# Patient Record
Sex: FEMALE | Race: WHITE | NOT HISPANIC OR LATINO | Employment: PART TIME | URBAN - METROPOLITAN AREA
[De-identification: names, ages, dates, MRNs, and addresses within clinical notes are randomized per-mention and may not be internally consistent; named-entity substitution may affect disease eponyms.]

---

## 2024-08-05 ENCOUNTER — TRANSFERRED RECORDS (OUTPATIENT)
Dept: HEALTH INFORMATION MANAGEMENT | Facility: CLINIC | Age: 19
End: 2024-08-05

## 2024-08-05 LAB
C TRACH DNA SPEC QL PROBE+SIG AMP: NEGATIVE
N GONORRHOEA DNA SPEC QL PROBE+SIG AMP: NEGATIVE
PAP-ABSTRACT: NORMAL

## 2024-08-16 ENCOUNTER — TELEPHONE (OUTPATIENT)
Dept: FAMILY MEDICINE | Facility: CLINIC | Age: 19
End: 2024-08-16
Payer: COMMERCIAL

## 2024-08-16 NOTE — TELEPHONE ENCOUNTER
Health Call Center    Phone Message    May a detailed message be left on voicemail: no     Reason for Call: Appointment Intake    Referring Provider Name: Dr. Herber Bates     Pt's provider, Sharon Bermeo, from [facility] sent a referral for pt to establish care with Dr. Bates for genital arousal disorder    Action Taken: Message routed to: Alba MARK paperwork, RAN, and general consent was sent to pt via email on 8/16    Date of Service: 8/16/2024 Marya Padilla

## 2024-09-05 ENCOUNTER — TELEPHONE (OUTPATIENT)
Dept: FAMILY MEDICINE | Facility: CLINIC | Age: 19
End: 2024-09-05
Payer: COMMERCIAL

## 2024-09-05 NOTE — CONFIDENTIAL NOTE
Telephone Intake--REST  Date: 2024  Client Name:  Yajaira Galo   MRN: [id number]  : 2005       Age:  19 year old      Presenting Problem / Reason for Assessment   (Clinical History &Symptoms):     Genital Arousal Disorder    Length of time experiencing symptoms:  Parents noticed ursula behavior around age 5    What is the Main Goal for the visit? Medical options and treatment    Have you seen another medical provider for this or a related issue? No  -- If yes, are you looking for a 2nd opinion or something else?       Have you seen a Mental Health Provider for this issue? (if so, where):    Therapist:  NO  --if yes, request a referral or summary letter from the therapist at the 1st session..    Psychiatrist:  NO  --if yes,, request records from the provider at the 1st session..      Other Medical/Mental Health Diagnoses:  ADHD, Anxiety        If needed, clarify if patient calling from group home or other supervised living arrangement  Note if patient has no mental health or cognitive diagnosis, but phone behavior suggests impairment      Medications:  Gabapentin, Propranolol (PRN)         Primary Care Provider: Sharon Bermeo MD  Provider and Clinic Name:  [facility]  --If multiple medical conditions, request recent records from primary doctor at the 1st session..      Referral Source:        Follow Up:        Please Verify Registration    If patient has [employer] or [email or web address], send to .     Prep Welcome Packet and have patient come half hour beforehand to fill out forms in packet (health history form, Safety Screening, PHQ9, and [id number].

## 2024-12-16 ENCOUNTER — OFFICE VISIT (OUTPATIENT)
Dept: FAMILY MEDICINE | Facility: CLINIC | Age: 19
End: 2024-12-16
Payer: COMMERCIAL

## 2024-12-16 DIAGNOSIS — F90.9 ATTENTION DEFICIT HYPERACTIVITY DISORDER (ADHD), UNSPECIFIED ADHD TYPE: ICD-10-CM

## 2024-12-16 DIAGNOSIS — F40.10 SOCIAL ANXIETY DISORDER: ICD-10-CM

## 2024-12-16 DIAGNOSIS — F52.6 SEXUAL PAIN DISORDER: Primary | ICD-10-CM

## 2024-12-16 PROCEDURE — 99205 OFFICE O/P NEW HI 60 MIN: CPT | Performed by: FAMILY MEDICINE

## 2024-12-16 RX ORDER — GABAPENTIN 300 MG/1
300 CAPSULE ORAL AT BEDTIME
Qty: 90 CAPSULE | Refills: 0 | Status: SHIPPED | OUTPATIENT
Start: 2024-12-16

## 2024-12-16 NOTE — PROGRESS NOTES
Initial evaluation type: Sexual Medicine Evaluation         HPI   ID: 19 year old cisgender woman         CC: Here for Initial evaluation type: Sexual Medicine Evaluation w    HPI:  Yajaira presents for possible persistent genital arousal dating back to early childhood    She recalls feeling sense of strong, uncomfortable pressure close to the clitoris starting around age 4-6, occurring maybe daily. Does not recall any trigger for it, maybe tighter clothing. Would sit on her foot and rub against it, which would help temporarily. She could ignore it fairly easily with distraction. Denies any inappropriate sexual contact in childhood.   Reached menarche age 11-12, were regular. She learned about masturbation during early adolescence. Tried to research her symptoms on line and thought she had a masturbation addiction. Would masturbate 2-3x/day, felt shameful, felt needed to try and stop. Unsure if reached orgasm. Did not talk to anyone or seek help.    Adolescence--present:  Significant social anxiety but no other health problems. Started general therapy in high school. Has never been sexually active with a partner outside of kissing, and has never masturbated for pleasure. Has had 1 romantic relationship, lasted 2 months. Attracted to men.    Current symptoms:  Have worsened since onset, now hourly to constant, masturbation only relieves symptoms for 5 minutes  Continues to have a sense of pressure, heaviness around clitoris.+/- vulvar discomfort No or minimal vaginal or pelvic symptoms; no spontaneous orgasm  Better: walking, physical activity, any distraction    Able to insert tampons without discomfort; +pain with tight clothing, but none with wiping    No focal numb/weakness anywhere, including vulva, limbs, no back pain, no bowel or bladder symptoms, no visual changes, no headaches    Additional diagnoses:  Social anxiety  Performance anxiety--takes propranolol as needed; note if has a peformance, is distracted  and genital symptoms reduced  ADHD--diagnosed age 17, took Adderall XR, concerta x 1 year  Depression--on sertraline, takes inconsistently      Current medical conditions:  Social anxiety  Performance anxiety   ADHD  Depression        No Known Allergies     Current medications:  As above    Past Medical History:  L elbow surgery  No hospitalizations    Family History:  Mother--thyroid problems,  GI problems, lipids  Father--lipids   Sibs: well  Mat grandfather--CAD, lipid      Social History:  Standard diet; + dairy  Activity: walk, [employer] college  College   Never smoker  Alcohol--1/month  Substances: none  No children      Sexual History:  Attracted to/sexuality: bisexual  Currently sexually active: never  Partners outside primary relationship: N/A  Number of partners: 0  Sexual activity: never penetrative/receptive sex  HPV vaccine status: 1 documented vaccine  PreP: No      ROS  + restless legs  12 point ROS negative except where noted above      Physical Exam  EXAM:  Vitals reviewed  Constitutional: healthy, alert, and no distress   Psychiatric: mentation appears normal, anxious, and judgment and insight intact    Pelvic Exam:  EG/BUS: Normal genital architecture without lesions, erythema or abnormal secretions Bartholin's, Urethra, [place]'s normal   Urethral meatus: normal   Q-tip test Postive 9-7:00    Exam by PCP on 8/5/2024 reviewed        A/P  Genital dysthesia/ sexual pain  Anxiety disorder  ADHD    Counseled patient on the broad clinical definition of PGAD, and the lack of evidence for a cause or limited number of causes that can be identified on testing. Discussed unusual presentation in childhood; reviewed lack of other symptoms to guide additional testing such as sacral MRI, or pelvic imaging.  Additional findings of positive Q tip test on exam may suggest overlap with vulvodynia or genital pain syndrome   Counseled that her underlying anxiety disorders would magnify intensity of symptoms.  Discussed  experience/exploration with orgasm, pleasurable sexual activity may increase sense of control, lessen shame/anxiety regarding symptoms    Recommend following options:  Reduce anxiety and arousal symptoms using gabapentin 300 mg at bedtime, can increase if tolerates at follow. Can consider other medications such as TCA, selective serotonin reuptake inhibitor/SNRI if not effective  Refer to Sex therapist  Consider Pelvic PT    Follow-up 1-2 months    I spent a total of 72 minutes on the day of the visit.   Time spent by me today doing chart review, history and exam, documentation and further activities per the note

## 2024-12-31 VITALS
SYSTOLIC BLOOD PRESSURE: 115 MMHG | HEIGHT: 65 IN | WEIGHT: 129.8 LBS | BODY MASS INDEX: 21.63 KG/M2 | DIASTOLIC BLOOD PRESSURE: 71 MMHG | HEART RATE: 75 BPM

## 2025-01-21 ENCOUNTER — OFFICE VISIT (OUTPATIENT)
Dept: FAMILY MEDICINE | Facility: CLINIC | Age: 20
End: 2025-01-21
Payer: COMMERCIAL

## 2025-01-21 DIAGNOSIS — F52.6 SEXUAL PAIN DISORDER: ICD-10-CM

## 2025-01-21 PROCEDURE — 99214 OFFICE O/P EST MOD 30 MIN: CPT | Performed by: FAMILY MEDICINE

## 2025-01-21 RX ORDER — GABAPENTIN 300 MG/1
600 CAPSULE ORAL AT BEDTIME
Qty: 180 CAPSULE | Refills: 0 | Status: SHIPPED | OUTPATIENT
Start: 2025-01-21

## 2025-01-21 RX ORDER — METHYLPHENIDATE HYDROCHLORIDE 27 MG/1
27 TABLET ORAL EVERY MORNING
COMMUNITY
Start: 2025-01-07

## 2025-01-21 RX ORDER — PROPRANOLOL HCL 20 MG
20 TABLET ORAL DAILY PRN
COMMUNITY

## 2025-01-21 NOTE — PROGRESS NOTES
HPI     NP student present  Yajaira presents for follow up of concern(s) listed below:    No chief complaint on file.    At last visit, seen for possible PGAD     Recommended following options:  Reduce anxiety and arousal symptoms using gabapentin 300 mg at bedtime, can increase if tolerates at follow. Can consider other medications such as TCA, selective serotonin reuptake inhibitor/SNRI if not effective  Refer to Sex therapist  Consider Pelvic PT      Today:  Tolerating gabapentin, initially a little tired in AM, then improved  Hasn't noticed significant symptom improvement yet.   Started working with therapist who is not a sex therapist but focuses on sexual issues, but cannot continue when patient goes back to school in [place] this Thursday.  No longer taking sertraline            Current Outpatient Medications   Medication Sig Dispense Refill    propranolol (INDERAL) 20 MG tablet Take 20 mg by mouth daily as needed (anxiety).      gabapentin (NEURONTIN) 300 MG capsule Take 1 capsule (300 mg) by mouth at bedtime. 90 capsule 0    methylphenidate HCL ER, OSM, (CONCERTA) 27 MG CR tablet Take 27 mg by mouth every morning.          No Known Allergies               Physical Exam:     Vital reviewed  Alert, NAD    Transferred Records on 08/05/2024   Component Date Value Ref Range Status    N Gonorrhea PCR 08/05/2024 Negative  Negative Final    Chlamydia Trachomatis PCR 08/05/2024 Negative  Negative Final    PAP-ABSTRACT 08/05/2024 See Scanned Document   Final         Assessment and Plan   Sexual arousal/pain disorder  Tolerating gabapentin, discussed options at this point  Recommend:  Increase gabapetin to total 600 mg daily, can be all at bedtime or split bid  Consider finding sex therapist in [place] or restablish care here  Consider compounding gabapentin/TCA topical if unable to tolerate     Follow-up in March over spring break.          Options for treatment and follow-up care were reviewed with the patient .  Yajaira Galo  engaged in the decision making process and verbalized understanding of the options discussed and agreed with the final plan.    Herber Bates MD

## 2025-01-22 VITALS
WEIGHT: 128.6 LBS | SYSTOLIC BLOOD PRESSURE: 115 MMHG | HEART RATE: 90 BPM | BODY MASS INDEX: 21.4 KG/M2 | DIASTOLIC BLOOD PRESSURE: 71 MMHG

## 2025-03-24 ENCOUNTER — OFFICE VISIT (OUTPATIENT)
Dept: FAMILY MEDICINE | Facility: CLINIC | Age: 20
End: 2025-03-24
Payer: COMMERCIAL

## 2025-03-24 VITALS — DIASTOLIC BLOOD PRESSURE: 72 MMHG | HEART RATE: 86 BPM | SYSTOLIC BLOOD PRESSURE: 123 MMHG

## 2025-03-24 DIAGNOSIS — F52.6 SEXUAL PAIN DISORDER: Primary | ICD-10-CM

## 2025-03-24 DIAGNOSIS — N90.89 CLITORAL IRRITATION: ICD-10-CM

## 2025-03-24 RX ORDER — DEXMETHYLPHENIDATE HYDROCHLORIDE 10 MG/1
10 CAPSULE, EXTENDED RELEASE ORAL DAILY
COMMUNITY

## 2025-03-24 NOTE — PROGRESS NOTES
JOSE LUIS       Yajaira presents for follow up of concern(s) listed below:    No chief complaint on file.    Here for follow-up of possible PGAD  Last visit:  Increased gabapentin 600 mg daily  Recommend sex therapist    Today:  Tolerating gabapentin, initially feels some relief from symptoms, but tends to lessen over time  Takes 600 mg at night.  Did only 3-4 sessions telehealth with therapist, due to being back college in [place]  Will be back in [place] in summer starting mid May  Notes clitoral pain and after orgasm, pain if inserts anything.       Current Outpatient Medications   Medication Sig Dispense Refill    dexmethylphenidate (FOCALIN XR) 10 MG 24 hr capsule Take 10 mg by mouth daily.      gabapentin (NEURONTIN) 300 MG capsule Take 2 capsules (600 mg) by mouth at bedtime. 180 capsule 0    propranolol (INDERAL) 20 MG tablet Take 20 mg by mouth daily as needed (anxiety).      methylphenidate HCL ER, OSM, (CONCERTA) 27 MG CR tablet Take 27 mg by mouth every morning. (Patient not taking: Reported on 3/24/2025)          No Known Allergies                 Physical Exam:     Vitals:    03/24/25 1003   BP: 123/72   BP Location: Left arm   Patient Position: Sitting   Cuff Size: Adult Regular   Pulse: 86     There is no height or weight on file to calculate BMI.    Alert, NAD    Transferred Records on 08/05/2024   Component Date Value Ref Range Status    N Gonorrhea PCR 08/05/2024 Negative  Negative Final    Chlamydia Trachomatis PCR 08/05/2024 Negative  Negative Final    PAP-ABSTRACT 08/05/2024 See Scanned Document   Final         Assessment and Plan   PGAD  Clitoral dysthesia  Discussed treatment options at this point. Given that having some benefit with gabapentin, will   Add 300 mg AM and continue 600 mg at bedtime for 1 month, then increase to   600 mg bid  Continue sex therapy   Referral to Pelvic PT     If gabapentin not working or not tolerated, consider trial Cymbalta, TCA or restless legs  medication    Follow-up 3-4 months]      There are no discontinued medications.    Options for treatment and follow-up care were reviewed with the patient . Yajaira Galo  engaged in the decision making process and verbalized understanding of the options discussed and agreed with the final plan.    Herber Bates MD

## 2025-03-26 ENCOUNTER — MYC REFILL (OUTPATIENT)
Dept: FAMILY MEDICINE | Facility: CLINIC | Age: 20
End: 2025-03-26
Payer: COMMERCIAL

## 2025-03-26 DIAGNOSIS — F52.6 SEXUAL PAIN DISORDER: ICD-10-CM

## 2025-03-27 RX ORDER — GABAPENTIN 300 MG/1
600 CAPSULE ORAL AT BEDTIME
Qty: 180 CAPSULE | Refills: 0 | Status: SHIPPED | OUTPATIENT
Start: 2025-03-27

## 2025-04-06 ENCOUNTER — HEALTH MAINTENANCE LETTER (OUTPATIENT)
Age: 20
End: 2025-04-06